# Patient Record
Sex: MALE | Race: OTHER | URBAN - METROPOLITAN AREA
[De-identification: names, ages, dates, MRNs, and addresses within clinical notes are randomized per-mention and may not be internally consistent; named-entity substitution may affect disease eponyms.]

---

## 2021-12-11 ENCOUNTER — EMERGENCY (EMERGENCY)
Facility: HOSPITAL | Age: 26
LOS: 0 days | Discharge: HOME | End: 2021-12-11
Payer: COMMERCIAL

## 2021-12-11 VITALS
HEIGHT: 66 IN | HEART RATE: 84 BPM | OXYGEN SATURATION: 98 % | TEMPERATURE: 98 F | RESPIRATION RATE: 16 BRPM | SYSTOLIC BLOOD PRESSURE: 135 MMHG | WEIGHT: 177.03 LBS | DIASTOLIC BLOOD PRESSURE: 76 MMHG
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
## 2021-12-11 DIAGNOSIS — S61.213A LACERATION WITHOUT FOREIGN BODY OF LEFT MIDDLE FINGER WITHOUT DAMAGE TO NAIL, INITIAL ENCOUNTER: ICD-10-CM
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
## 2021-12-11 DIAGNOSIS — Z23 ENCOUNTER FOR IMMUNIZATION: ICD-10-CM
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
## 2021-12-11 PROCEDURE — 12001 RPR S/N/AX/GEN/TRNK 2.5CM/<: CPT

## 2021-12-11 PROCEDURE — 99283 EMERGENCY DEPT VISIT LOW MDM: CPT | Mod: 25

## 2021-12-11 RX ORDER — CEPHALEXIN 500 MG
1 CAPSULE ORAL
Qty: 15 | Refills: 0
Start: 2021-12-11 | End: 2021-12-15

## 2021-12-11 RX ORDER — TETANUS TOXOID, REDUCED DIPHTHERIA TOXOID AND ACELLULAR PERTUSSIS VACCINE, ADSORBED 5; 2.5; 8; 8; 2.5 [IU]/.5ML; [IU]/.5ML; UG/.5ML; UG/.5ML; UG/.5ML
0.5 SUSPENSION INTRAMUSCULAR ONCE
Refills: 0 | Status: COMPLETED | OUTPATIENT
Start: 2021-12-11 | End: 2021-12-11

## 2021-12-11 RX ORDER — CEPHALEXIN 500 MG
250 CAPSULE ORAL EVERY 6 HOURS
Refills: 0 | Status: DISCONTINUED | OUTPATIENT
Start: 2021-12-11 | End: 2021-12-11

## 2021-12-11 RX ORDER — IBUPROFEN 200 MG
1 TABLET ORAL
Qty: 20 | Refills: 0
Start: 2021-12-11 | End: 2021-12-15

## 2021-12-11 RX ORDER — ACETAMINOPHEN 500 MG
975 TABLET ORAL ONCE
Refills: 0 | Status: COMPLETED | OUTPATIENT
Start: 2021-12-11 | End: 2021-12-11

## 2021-12-11 RX ADMIN — TETANUS TOXOID, REDUCED DIPHTHERIA TOXOID AND ACELLULAR PERTUSSIS VACCINE, ADSORBED 0.5 MILLILITER(S): 5; 2.5; 8; 8; 2.5 SUSPENSION INTRAMUSCULAR at 17:56

## 2021-12-11 RX ADMIN — Medication 975 MILLIGRAM(S): at 17:56

## 2021-12-11 RX ADMIN — Medication 250 MILLIGRAM(S): at 17:56

## 2021-12-11 NOTE — ED ADULT NURSE NOTE - OBJECTIVE STATEMENT
26 year old male no pmh c/o pain to left hand after sustaining injury to left hand 3rd digit, with knife. Patient was trying to separate frozen paties and accidently cut self.

## 2021-12-11 NOTE — ED PROVIDER NOTE - PATIENT PORTAL LINK FT
No You can access the FollowMyHealth Patient Portal offered by [facility] by registering at the following website: [email or web address]. By joining [employer]’s FollowMyHealth portal, you will also be able to view your health information using other applications (apps) compatible with our system.

## 2021-12-11 NOTE — ED PROVIDER NOTE - NSFOLLOWUPCLINICS_GEN_ALL_ED_FT
[facility]  General Surgery  [address] [place] [address] - [address] [place] [address]  Phone: [phone]  Fax:   Follow Up Time: 7-10 Days

## 2021-12-11 NOTE — ED PROVIDER NOTE - OBJECTIVE STATEMENT
NKDA  PMhx- asthma    25 y/o RHD M with above pmhx presents with lac to L 3rd finger, middle phalanx from a knife PTA.  Pt states that he was trying to separate meat patties and the knife slipped, puncturing his finger. Unknown last tetanus. NKDA  PMhx- asthma    27 y/o RHD M with above pmhx presents with lac to L 3rd finger, middle phalanx from a knife x 1 hr.  Pt states that he was trying to separate meat patties and the knife slipped, puncturing his finger. Unknown last tetanus. NKDA  PMhx- asthma    27 y/o RHD M with above pmhx presents with lac to L 3rd finger, middle phalanx from a knife x 1 hr.  Pt states that he was trying to separate meat patties and the knife slipped, puncturing his finger. Unknown last tetanus.  Denies F/C, numbness, FB.

## 2021-12-11 NOTE — ED PROVIDER NOTE - CLINICAL SUMMARY MEDICAL DECISION MAKING FREE TEXT BOX
Left 3rd finger laceration.   sutured. Keflex, tdap, motrin  suture removal in 7-10 days. RTER if any sx of infx Left 3rd finger laceration.   Irrigated and sutured. Keflex, tdap, motrin  suture removal in 7-10 days. RTER if any sx of infx

## 2021-12-11 NOTE — ED PROVIDER NOTE - PHYSICAL EXAMINATION
NAD  L finger- + 1 cm lac. NAD. Appears comfortable. Ambulatory with steady gait.  L  3rd finger- + 1 cm lac to volar surface of middle phalanx., no bone, no FB seen. FROM, 5/5 mus strength. NAD. Appears comfortable. Ambulatory with steady gait.  L  3rd finger- + 1 cm lac to volar surface of middle phalanx., no bone, no FB seen. No active bleeding. FROM, 5/5 mus strength.
